# Patient Record
Sex: MALE | Race: BLACK OR AFRICAN AMERICAN | Employment: PART TIME | URBAN - METROPOLITAN AREA
[De-identification: names, ages, dates, MRNs, and addresses within clinical notes are randomized per-mention and may not be internally consistent; named-entity substitution may affect disease eponyms.]

---

## 2018-07-15 ENCOUNTER — HOSPITAL ENCOUNTER (EMERGENCY)
Age: 33
Discharge: HOME | End: 2018-07-15
Attending: EMERGENCY MEDICINE
Payer: SELF-PAY

## 2018-07-15 VITALS
DIASTOLIC BLOOD PRESSURE: 92 MMHG | SYSTOLIC BLOOD PRESSURE: 143 MMHG | OXYGEN SATURATION: 100 % | WEIGHT: 155 LBS | RESPIRATION RATE: 16 BRPM | HEIGHT: 74 IN | HEART RATE: 85 BPM | BODY MASS INDEX: 19.89 KG/M2 | TEMPERATURE: 98.4 F

## 2018-07-15 DIAGNOSIS — R21 RASH AND OTHER NONSPECIFIC SKIN ERUPTION: Primary | ICD-10-CM

## 2018-07-15 PROCEDURE — 99282 EMERGENCY DEPT VISIT SF MDM: CPT

## 2018-07-15 RX ORDER — CEPHALEXIN 500 MG/1
500 CAPSULE ORAL 4 TIMES DAILY
Qty: 28 CAP | Refills: 0 | Status: SHIPPED | OUTPATIENT
Start: 2018-07-15 | End: 2018-07-22

## 2018-07-15 RX ORDER — TRIAMCINOLONE ACETONIDE 1 MG/ML
LOTION TOPICAL 3 TIMES DAILY
Qty: 60 ML | Refills: 0 | Status: SHIPPED | OUTPATIENT
Start: 2018-07-15 | End: 2019-10-23

## 2018-07-15 RX ORDER — PREDNISONE 10 MG/1
TABLET ORAL
Qty: 21 TAB | Refills: 0 | Status: SHIPPED | OUTPATIENT
Start: 2018-07-15 | End: 2019-10-23

## 2018-07-15 NOTE — ED PROVIDER NOTES
EMERGENCY DEPARTMENT HISTORY AND PHYSICAL EXAM    11:52 AM      Date: 7/15/2018  Patient Name: Huong Guardado    History of Presenting Illness     Chief Complaint   Patient presents with    Rash     History Provided By: Patient    Chief Complaint: Rash  Duration: ~2 Weeks  Timing:  Worsening  Location: Bilateral forearms  Quality: Itchy and draining  Severity: 6 out of 10  Modifying Factors: No relief with Motrin or Calamine lotion  Associated Symptoms: denies any other associated signs or symptoms      Additional History (Context):Kai Lira is a 34 y.o. male with pertinent history of asthma who presents to the emergency department for evaluation of a worsening itchy and draining 6/10 rash on his bilateral forearms, onset ~2 weeks ago. Pt states that the rash first began as a heat rash and since then has been spreading to other parts of his body including his neck and legs. Pt reports hx and fhx of heat rashes but states that he has never had one to this extent. Pt states that he spent all of yesterday outside in the sun. Pt has tried Motrin and Calamine lotion with no relief. Endorses tobacco (1/4 ppd) and occasional EtOH and marijuana use. NKDA. Pt denies hx of eczema or psoriasis, contact with an individual with a similar rash, fevers, n/v/d/c, dysuria, hematuria, and frequency. Patient has no other complaints at this time. PCP:  Eri Mo MD    Past History     Past Medical History:  History reviewed. No pertinent past medical history. Past Surgical History:  History reviewed. No pertinent surgical history. Family History:  History reviewed. No pertinent family history.     Social History:  Social History   Substance Use Topics    Smoking status: Current Every Day Smoker     Packs/day: 0.25    Smokeless tobacco: Never Used    Alcohol use Yes      Comment: occ       Allergies:  No Known Allergies      Review of Systems     Review of Systems   Constitutional: Negative for chills and fever.   HENT: Negative for congestion, rhinorrhea and sore throat. Respiratory: Negative for cough and shortness of breath. Cardiovascular: Negative for chest pain. Gastrointestinal: Negative for abdominal pain, blood in stool, constipation, diarrhea, nausea and vomiting. Genitourinary: Negative for dysuria, frequency and hematuria. Musculoskeletal: Negative for back pain and myalgias. Skin: Positive for rash (bilateral forearms). Negative for wound. Neurological: Negative for dizziness and headaches. Physical Exam     Visit Vitals    BP (!) 143/92 (BP 1 Location: Right arm, BP Patient Position: At rest;Sitting)  Comment: denies Hx HTN    Pulse 85    Temp 98.4 °F (36.9 °C)    Resp 16    Ht 6' 2\" (1.88 m)    Wt 70.3 kg (155 lb)    SpO2 100%    BMI 19.9 kg/m2         Physical Exam   Constitutional: He is oriented to person, place, and time. He appears well-developed and well-nourished. No distress. HENT:   Head: Normocephalic and atraumatic. Eyes: Conjunctivae are normal.   Neck: Normal range of motion. Neck supple. Cardiovascular: Normal rate, regular rhythm and normal heart sounds. Pulmonary/Chest: Effort normal and breath sounds normal. No respiratory distress. He exhibits no tenderness. Musculoskeletal: Normal range of motion. He exhibits no edema or deformity. Neurological: He is alert and oriented to person, place, and time. Skin: Skin is warm and dry. Rash noted. He is not diaphoretic. Erythematous, scaling, plaques noted to flexural surfaces of BUE and bilateral neck. Some with serosanguinous weeping. Psychiatric: He has a normal mood and affect. Nursing note and vitals reviewed. Diagnostic Study Results     Labs -  No results found for this or any previous visit (from the past 12 hour(s)). Radiologic Studies -   No results found. Medical Decision Making   I am the first provider for this patient.     I reviewed the vital signs, available nursing notes, past medical history, past surgical history, family history and social history. Vital Signs-Reviewed the patient's vital signs. Pulse Oximetry Analysis -  100% on room air     Records Reviewed: Nursing Notes (Time of Review: 11:52 AM)    ED Course: Progress Notes, Reevaluation, and Consults:    Provider Notes (Medical Decision Making):   Differential Diagnosis:  Tinea, eczema, psoriasis, dry skin, scabies, allergic reaction, varicella, HSV, acne, bug bites, impetigo, folliculitis, pityriasis, poison ivy/oak/sumac, syphilis, lichen planus, erythema multiforme, Staphylococcal scalded skin syndrome, SJS/TEN, lupus, RMSF, erythema migrans, lichen planus, bullous pemphigoid, dermatitis herpetiformis, bacterial meningitis, chiggers    Plan:  Pt presents in NAD, mildly elevated BP with otherwise normal vitals. Exam c/w eczema with possible developing cellulitis. Will DC home with keflex, steroids, triamcinolone. At this time, patient is stable and appropriate for discharge home. Patient demonstrates understanding of current diagnoses and is in agreement with the treatment plan. They are advised that while the likelihood of serious underlying condition is low at this point given the evaluation performed today, we cannot fully rule it out. They are advised to immediately return with any new symptoms or worsening of current condition. All questions have been answered. Patient is given educational material regarding their diagnoses, including danger symptoms and when to return to the ED. Diagnosis     Clinical Impression:   1.  Rash and other nonspecific skin eruption      Disposition: DC home    Follow-up Information     Follow up With Details Comments Contact Antonio Gutierrez MD Call in 2 days For follow-up Gris Taylor [phone] [facility] EMERGENCY DEPT Go to As needed, If symptoms worsen [address]  [phone] Patient's Medications   Start Taking    CEPHALEXIN (KEFLEX) 500 MG CAPSULE    Take 1 Cap by mouth four (4) times daily for 7 days. PREDNISONE (DELTASONE) 10 MG TABLET    Take six pills on Day 1, five pills on Day 2, four pills on Day 3, three pills on Day 4, two pills on Day 5, and one pill on Day 6. TRIAMCINOLONE (KENALOG) 0.1 % LOTION    Apply  to affected area three (3) times daily. use thin layer   Continue Taking    No medications on file   These Medications have changed    No medications on file   Stop Taking    No medications on file     _______________________________     1 [facility] acting as a scribe for and in the presence of Lilo Mueller PA-C      July 15, 2018 at 12:03 PM       Provider Attestation:      I personally performed the services described in the documentation, reviewed the documentation, as recorded by the scribe in my presence, and it accurately and completely records my words and actions.  July 15, 2018 at 12:03 PM - Lilo Mueller PA-C

## 2019-10-23 ENCOUNTER — HOSPITAL ENCOUNTER (EMERGENCY)
Age: 34
Discharge: HOME | End: 2019-10-24
Attending: EMERGENCY MEDICINE | Admitting: EMERGENCY MEDICINE
Payer: SELF-PAY

## 2019-10-23 DIAGNOSIS — R06.2 WHEEZING: Primary | ICD-10-CM

## 2019-10-23 PROCEDURE — 99284 EMERGENCY DEPT VISIT MOD MDM: CPT

## 2019-10-24 VITALS
WEIGHT: 155 LBS | TEMPERATURE: 97.8 F | RESPIRATION RATE: 16 BRPM | SYSTOLIC BLOOD PRESSURE: 122 MMHG | BODY MASS INDEX: 19.89 KG/M2 | OXYGEN SATURATION: 97 % | DIASTOLIC BLOOD PRESSURE: 75 MMHG | HEART RATE: 80 BPM | HEIGHT: 74 IN

## 2019-10-24 RX ORDER — ALBUTEROL SULFATE 90 UG/1
2 AEROSOL, METERED RESPIRATORY (INHALATION)
Qty: 1 INHALER | Refills: 0 | Status: SHIPPED | OUTPATIENT
Start: 2019-10-24

## 2019-10-24 RX ORDER — ALBUTEROL SULFATE 90 UG/1
2 AEROSOL, METERED RESPIRATORY (INHALATION)
Status: DISCONTINUED | OUTPATIENT
Start: 2019-10-24 | End: 2019-10-24

## 2019-10-24 RX ORDER — ALBUTEROL SULFATE 0.83 MG/ML
2.5 SOLUTION RESPIRATORY (INHALATION)
Status: DISCONTINUED | OUTPATIENT
Start: 2019-10-24 | End: 2019-10-24

## 2019-10-24 NOTE — ED NOTES
I have reviewed discharge instructions with the patient. The patient verbalized understanding. Patient armband removed and shredded. Pt d/cd to home awake, alert and in NAD. All questions answered. 1 prescription given.

## 2019-10-24 NOTE — ED TRIAGE NOTES
Wheezing and SOB started this afternoon, no relief from albuterol inhaler. EMS administered Duoneb x1.

## 2019-10-24 NOTE — DISCHARGE INSTRUCTIONS
Patient Education        Wheezing or Bronchoconstriction: Care Instructions  Your Care Instructions  Wheezing is a whistling noise made during breathing. It occurs when the small airways, or bronchial tubes, that lead to your lungs swell or contract (spasm) and become narrow. This narrowing is called bronchoconstriction. When your airways constrict, it is hard for air to pass through and this makes it hard for you to breathe. Wheezing and bronchoconstriction can be caused by many problems, including:  · An infection such as the flu or a cold. · Allergies such as hay fever. · Diseases such as asthma or chronic obstructive pulmonary disease. · Smoking. Treatment for your wheezing depends on what is causing the problem. Your wheezing may get better without treatment. But you may need to pay attention to things that cause your wheezing and avoid them. Or you may need medicine to help treat the wheezing and to reduce the swelling or to relieve spasms in your lungs. Follow-up care is a key part of your treatment and safety. Be sure to make and go to all appointments, and call your doctor if you are having problems. It is also a good idea to know your test results and keep a list of the medicines you take. How can you care for yourself at home? · Take your medicine exactly as prescribed. Call your doctor if you think you are having a problem with your medicine. You will get more details on the specific medicine your doctor prescribes. · If your doctor prescribed antibiotics, take them as directed. Do not stop taking them just because you feel better. You need to take the full course of antibiotics. · Breathe moist air from a humidifier, hot shower, or sink filled with hot water. This may help ease your symptoms and make it easier for you to breathe. · If you have congestion in your nose and throat, drinking plenty of fluids, especially hot fluids, may help relieve your symptoms.  If you have kidney, heart, or liver disease and have to limit fluids, talk with your doctor before you increase the amount of fluids you drink. · If you have mucus in your airways, it may help to breathe deeply and cough. · Do not smoke or allow others to smoke around you. Smoking can make your wheezing worse. If you need help quitting, talk to your doctor about stop-smoking programs and medicines. These can increase your chances of quitting for good. · Avoid things that may cause your wheezing. These may include colds, smoke, air pollution, dust, pollen, pets, cockroaches, stress, and cold air. When should you call for help? Call 911 anytime you think you may need emergency care. For example, call if:    · You have severe trouble breathing.     · You passed out (lost consciousness).    Call your doctor now or seek immediate medical care if:    · You cough up yellow, dark brown, or bloody mucus (sputum).     · You have new or worse shortness of breath.     · Your wheezing is not getting better or it gets worse after you start taking your medicine.    Watch closely for changes in your health, and be sure to contact your doctor if:    · You do not get better as expected. Where can you learn more? Go to http://williams-fadi.info/. Enter 454 3023 in the search box to learn more about \"Wheezing or Bronchoconstriction: Care Instructions. \"  Current as of: June 9, 2019  Content Version: 12.2  © 4968-8087 [employer]. Care instructions adapted under license by [employer] (which disclaims liability or warranty for this information). If you have questions about a medical condition or this instruction, always ask your healthcare professional. [address] any warranty or liability for your use of this information.

## 2020-06-04 ENCOUNTER — HOSPITAL ENCOUNTER (EMERGENCY)
Age: 35
Discharge: HOME | End: 2020-06-04
Attending: EMERGENCY MEDICINE
Payer: SELF-PAY

## 2020-06-04 VITALS
SYSTOLIC BLOOD PRESSURE: 141 MMHG | TEMPERATURE: 98.9 F | HEART RATE: 76 BPM | HEIGHT: 74 IN | RESPIRATION RATE: 17 BRPM | BODY MASS INDEX: 20.53 KG/M2 | DIASTOLIC BLOOD PRESSURE: 88 MMHG | WEIGHT: 160 LBS | OXYGEN SATURATION: 98 %

## 2020-06-04 DIAGNOSIS — K08.89 PAIN, DENTAL: Primary | ICD-10-CM

## 2020-06-04 PROCEDURE — 74011250637 HC RX REV CODE- 250/637: Performed by: EMERGENCY MEDICINE

## 2020-06-04 PROCEDURE — 99283 EMERGENCY DEPT VISIT LOW MDM: CPT

## 2020-06-04 PROCEDURE — 74011000250 HC RX REV CODE- 250: Performed by: EMERGENCY MEDICINE

## 2020-06-04 PROCEDURE — 75810000283 HC INJECTION NERVE BLOCK

## 2020-06-04 RX ORDER — BUPIVACAINE HYDROCHLORIDE 5 MG/ML
5 INJECTION, SOLUTION EPIDURAL; INTRACAUDAL ONCE
Status: COMPLETED | OUTPATIENT
Start: 2020-06-04 | End: 2020-06-04

## 2020-06-04 RX ORDER — IBUPROFEN 600 MG/1
600 TABLET ORAL
Qty: 20 TAB | Refills: 0 | Status: SHIPPED | OUTPATIENT
Start: 2020-06-04

## 2020-06-04 RX ORDER — ACETAMINOPHEN 325 MG/1
650 TABLET ORAL
Qty: 20 TAB | Refills: 0 | Status: SHIPPED | OUTPATIENT
Start: 2020-06-04

## 2020-06-04 RX ORDER — PENICILLIN V POTASSIUM 250 MG/1
500 TABLET, FILM COATED ORAL
Status: COMPLETED | OUTPATIENT
Start: 2020-06-04 | End: 2020-06-04

## 2020-06-04 RX ORDER — PENICILLIN V POTASSIUM 500 MG/1
500 TABLET, FILM COATED ORAL 4 TIMES DAILY
Qty: 28 TAB | Refills: 0 | Status: SHIPPED | OUTPATIENT
Start: 2020-06-04 | End: 2020-06-11

## 2020-06-04 RX ADMIN — PENICILLIN V POTASIUM 500 MG: 250 TABLET ORAL at 07:44

## 2020-06-04 RX ADMIN — BUPIVACAINE HYDROCHLORIDE 25 MG: 5 INJECTION, SOLUTION EPIDURAL; INTRACAUDAL; PERINEURAL at 08:08

## 2020-06-04 NOTE — ED TRIAGE NOTES
Patient states broke a tooth off 8 months ago had non pain x 2 days severe pain - taken naproxen, motrin- Patient has dentist appointment Monday at 0800.  Patient states head throbbing

## 2020-06-04 NOTE — DISCHARGE INSTRUCTIONS
Patient Education        Head or Face Pain: Care Instructions  Your Care Instructions     Common causes of head or face pain are allergies, stress, and injuries. Other causes include tooth problems and sinus infections. Eating certain foods, such as chocolate or cheese, or drinking certain liquids, such as coffee or cola, can cause head pain for some people. If you have mild head pain, you may not need treatment. It is important to watch your symptoms and talk to your doctor if your pain continues or gets worse. Follow-up care is a key part of your treatment and safety. Be sure to make and go to all appointments, and call your doctor if you are having problems. It's also a good idea to know your test results and keep a list of the medicines you take. How can you care for yourself at home? · Take pain medicines exactly as directed. ? If the doctor gave you a prescription medicine for pain, take it as prescribed. ? If you are not taking a prescription pain medicine, ask your doctor if you can take an over-the-counter pain medicine. · Take it easy for the next few days or longer if you are not feeling well. · Use a warm, moist towel or heating pad set on low to relax tight muscles in your shoulder and neck. Have someone gently massage your neck and shoulders. · Put ice or a cold pack on the area for 10 to 20 minutes at a time. Put a thin cloth between the ice and your skin. When should you call for help? [id number] anytime you think you may need emergency care. For example, call if:  · You have twitching, jerking, or a seizure. · You passed out (lost consciousness). · You have symptoms of a stroke. These may include:  ? Sudden numbness, tingling, weakness, or loss of movement in your face, arm, or leg, especially on only one side of your body. ? Sudden vision changes. ? Sudden trouble speaking. ? Sudden confusion or trouble understanding simple statements. ? Sudden problems with walking or balance. ?  A sudden, severe headache that is different from past headaches. · You have jaw pain and pain in your chest, shoulder, neck, or arm. Call your doctor now or seek immediate medical care if:  · You have a fever with a stiff neck or a severe headache. · You have nausea and vomiting, or you cannot keep food or liquids down. Watch closely for changes in your health, and be sure to contact your doctor if:  · Your head or face pain does not get better as expected. Where can you learn more? Go to http://williams-fadi.info/  Enter P568 in the search box to learn more about \"Head or Face Pain: Care Instructions. \"  Current as of: June 26, 2019               Content Version: 12.5  © 8462-1628 [employer]. Care instructions adapted under license by [employer] (which disclaims liability or warranty for this information). If you have questions about a medical condition or this instruction, always ask your healthcare professional. [place] [address] any warranty or liability for your use of this information. Patient Education        Periodontal Conditions: Care Instructions  Your Care Instructions    Periodontal conditions affect the gums, bone, and tissue that surround and support the teeth. The most common problems are caused by plaque. Plaque is a thin film of bacteria that sticks to teeth above and below the gum line. It can build up and harden into tartar. The bacteria in plaque and tartar can cause gum disease. Gingivitis is a disease that affects the gums (gingiva). The gums are the soft tissue that surrounds the teeth. Gingivitis causes red, swollen, tender gums that bleed easily when brushed, persistent bad breath, and sensitive teeth. Because it is not painful, many people do not get treatment when they should. Gingivitis can be reversed with good dental care. Periodontitis is a more advanced disease that affects more than the gums.  The gums pull away from the teeth. This leaves deep pockets where bacteria can grow. The disease can damage the bones that support the teeth. The teeth may get loose and fall out. A periodontal condition should be treated as soon as it is found. Finding gum problems early, treating them right away, and having regular checkups bring the best results. You can treat mild periodontal conditions by brushing and flossing your teeth every day. Your dentist may prescribe a mouthwash to kill the bacteria that can damage teeth and gums. Your dentist may have you take antibiotics to treat infection from moderate periodontal disease. If your gums have pulled away from your teeth, you may need cleaning between the teeth and gums right down to the teeth roots. This is called root planing and scaling. If you have severe periodontal disease, you may need surgery to remove diseased gum tissue or repair bone damage. Follow-up care is a key part of your treatment and safety. Be sure to make and go to all appointments, and call your dentist if you are having problems. It's also a good idea to know your test results and keep a list of the medicines you take. How can you care for yourself at home? · If your dentist prescribed antibiotics, take them as directed. Do not stop taking them just because you feel better. You need to take the full course of antibiotics. · Brush your teeth twice a day, in the morning and at night. ? Use a toothbrush with soft, rounded-end bristles and a head that is small enough to reach all parts of your teeth and mouth. Replace your toothbrush every 3 to 4 months. ? Use a fluoride toothpaste. ? Place the brush at a 45-degree angle where the teeth meet the gums. Press firmly, and gently rock the brush back and forth using small circular movements. ? Brush chewing surfaces vigorously with short back-and-forth strokes. ? Brush your tongue from back to front. · Floss at least once a day.  Choose the type and flavor that you like best.  · Have your teeth cleaned by a professional at least twice a year. · Ask your dentist about using an antibacterial mouthwash to help reduce bacteria. · Rinse your mouth with water or chew sugar-free gum after meals if you can't brush your teeth. · Do not smoke or use smokeless tobacco. Tobacco use can cause periodontal disease. When should you call for help? Call your dentist now or seek immediate medical care if:  · You have symptoms of infection, such as:  ? Increased pain, swelling, warmth, or redness. ? Red streaks leading from the area. ? Pus draining from the area. ? A fever. Watch closely for changes in your health, and be sure to contact your dentist if:  · You have new or worse tooth pain. · You do not get better as expected. Where can you learn more? Go to http://williams-fadi.info/  Enter H913 in the search box to learn more about \"Periodontal Conditions: Care Instructions. \"  Current as of: March 25, 2020               Content Version: 12.5  © 2006-2020 [employer]. Care instructions adapted under license by [employer] (which disclaims liability or warranty for this information). If you have questions about a medical condition or this instruction, always ask your healthcare professional. [address] any warranty or liability for your use of this information.

## 2020-06-04 NOTE — ED PROVIDER NOTES
Date: 6/4/2020  Patient Name: Charis Drew    History of Presenting Illness     Chief Complaint   Patient presents with    Dental Pain       History Provided By: Patient    HPI/Chief Complaint: (Context):who presents with dental pain, greater than a week  Left lower jaw  Constant symptoms throbbing symptoms tried some oral medication and also topical medication not working has an appointment in 3 days  History of trauma months ago. Progressively worse in the last few days  No fever no cough no congestion no trismus no swelling no trouble swallowing no chest pain or shortness of breath no abdominal pain no cough congestion no fever  Patient is otherwise in no distress no complaints in the emergency department there is pain radiating to the left jaw and face as well as patient moves and shoes on that side. Sharp pain  Moderate to severe pain    ---  Blood pressure patient's triage note is reviewed  TITUS level 4  Dental pain  Blood pressure is 144/1 2  Rest of vitals are stable in the emergency department  Patient broken tooth pain increased  Pain is 9 out of 10  Allergies are none  Home medication include albuterol  Medical history is asthma  Social is a smoker, alcohol, marijuana use  Patient's chart review shows patient has prior visit for wheezing and rash. PCP: None    Current Outpatient Medications   Medication Sig Dispense Refill    benzocaine (ORAJEL) 10 % mucosal gel 1 Applicator by Mouth/Throat route three (3) times daily for 3 days. 9 g 0    ibuprofen (MOTRIN) 600 mg tablet Take 1 Tab by mouth every six (6) hours as needed for Pain. 20 Tab 0    acetaminophen (TYLENOL) 325 mg tablet Take 2 Tabs by mouth every four (4) hours as needed for Pain. 20 Tab 0    penicillin v potassium (VEETID) 500 mg tablet Take 1 Tab by mouth four (4) times daily for 7 days.  28 Tab 0    albuterol (PROVENTIL HFA, VENTOLIN HFA, PROAIR HFA) 90 mcg/actuation inhaler Take 2 Puffs by inhalation every four (4) hours as needed for Wheezing. 1 Inhaler 0       Past History     Past Medical History:  Past Medical History:   Diagnosis Date    Asthma        Past Surgical History:  No past surgical history on file. Family History:  No family history on file. Social History:  Social History     Tobacco Use    Smoking status: Current Every Day Smoker     Packs/day: 0.10    Smokeless tobacco: Never Used   Substance Use Topics    Alcohol use: Yes     Comment: socially    Drug use: Not Currently     Types: Marijuana       Allergies:  No Known Allergies      Review of Systems   Review of Systems   Constitutional: Negative for activity change, fatigue and fever. HENT: Positive for dental problem. Negative for congestion and rhinorrhea. Eyes: Negative for visual disturbance. Respiratory: Negative for shortness of breath. Cardiovascular: Negative for chest pain and palpitations. Gastrointestinal: Negative for abdominal pain, diarrhea, nausea and vomiting. Genitourinary: Negative for dysuria and hematuria. Musculoskeletal: Negative for back pain. Skin: Negative for rash. Neurological: Negative for dizziness, weakness and light-headedness. Psychiatric/Behavioral: Negative for agitation. All other systems reviewed and are negative. Physical Exam     Physical Exam  Constitutional:       Appearance: He is well-developed. HENT:      Head: Normocephalic and atraumatic. Comments: Left lower jaw, tenderness in the first molar  No swelling no erythema no warmth to the soft tissue no abscess no fluctuance or any discharge. Full range of motion mouth  No complaints otherwise  No trismus no cervical lymphadenopathy    Eyes:      Conjunctiva/sclera: Conjunctivae normal.      Pupils: Pupils are equal, round, and reactive to light. Neck:      Musculoskeletal: Normal range of motion and neck supple. Cardiovascular:      Rate and Rhythm: Normal rate and regular rhythm.    Pulmonary:      Effort: Pulmonary effort is normal.      Breath sounds: Normal breath sounds. Abdominal:      General: Bowel sounds are normal.      Palpations: Abdomen is soft. Musculoskeletal: Normal range of motion. Lymphadenopathy:      Cervical: No cervical adenopathy. Skin:     General: Skin is warm. Neurological:      Mental Status: He is alert. Medical Decision Making   I am the first provider for this patient. I reviewed the vital signs, available nursing notes, past medical history, past surgical history, family history and social history. Provider Notes (Medical Decision Making): Patient with dental pain  No sign of abscess or cellulitis  Acute antibiotics follow-up recommended  We discussed pain management of added all the medications taking him in the emergency department I will also give him Marcaine injection as patient is requesting that as well for acute pain  Agrees with all information agrees with the plan agrees to follow-up      Vital Signs-Reviewed the patient's vital signs. Pulse Oximetry Analysis -99%, room air, normal    Vitals:    06/04/20 0651 06/04/20 0735   BP: (!) 144/102 129/85   Pulse: 76    Resp: 17    Temp: 98.9 °F (37.2 °C)    SpO2: 99% 98%   Weight: 72.6 kg (160 lb)    Height: 6' 2\" (1.88 m)        Records Reviewed: Nursing Notes    ED Course:     Nerve Block  Date/Time: 6/4/2020 8:13 AM  Performed by: Dilan Gan MD  Authorized by: Dilan Gan MD     Consent:     Consent obtained:  Verbal    Consent given by:  Patient    Risks discussed:   Allergic reaction, bleeding, infection, intravenous injection, pain, nerve damage, swelling and unsuccessful block    Alternatives discussed:  Referral, alternative treatment, delayed treatment and no treatment  Indications:     Indications:  Pain relief  Location:     Nerve block body site: alveolar nerve - left oral.    Laterality:  Left  Skin anesthesia (see MAR for exact dosages):     Skin anesthesia method:  None  Procedure details (see MAR for exact dosages): Block needle gauge:  27 G    Anesthetic injected:  Bupivacaine 0.5% w/o epi    Steroid injected:  None    Additive injected:  None    Injection procedure:  Anatomic landmarks identified    Paresthesia:  None  Post-procedure details:     Dressing:  None    Outcome:  Anesthesia achieved    Patient tolerance of procedure: Tolerated well, no immediate complications              Diagnostic Study Results     Orders Placed This Encounter    NERVE BLOCK (ASAP ONLY)     This order was created via procedure documentation     Standing Status:   Standing     Number of Occurrences:   1    bupivacaine (PF) (MARCAINE) 0.5 % (5 mg/mL) injection 25 mg    penicillin v potassium (VEETID) tablet 500 mg     Order Specific Question:   Antibiotic Indications     Answer:   Skin and Soft Tissue Infection    benzocaine (ORAJEL) 10 % mucosal gel     Si Applicator by Mouth/Throat route three (3) times daily for 3 days. Dispense:  9 g     Refill:  0    ibuprofen (MOTRIN) 600 mg tablet     Sig: Take 1 Tab by mouth every six (6) hours as needed for Pain. Dispense:  20 Tab     Refill:  0    acetaminophen (TYLENOL) 325 mg tablet     Sig: Take 2 Tabs by mouth every four (4) hours as needed for Pain. Dispense:  20 Tab     Refill:  0    penicillin v potassium (VEETID) 500 mg tablet     Sig: Take 1 Tab by mouth four (4) times daily for 7 days. Dispense:  28 Tab     Refill:  0       Labs -   No results found for this or any previous visit (from the past 12 hour(s)). Radiologic Studies -   No orders to display     CT Results  (Last 48 hours)    None        CXR Results  (Last 48 hours)    None              Discharge     Clinical Impression:   1.  Pain, dental        Disposition:  Home    It should be noted that I will be the provider of record for this patient  Lisa Pretty MD      Follow-up Information     Follow up With Specialties Details Why [facility] EMERGENCY DEPT Emergency Medicine  If symptoms worsen [address] [place] [address]    [place]  Call in 1 day Follow Up From Emergency Department Brian Velasquez Emmett Acosta Erik          Current Discharge Medication List      START taking these medications    Details   benzocaine (ORAJEL) 10 % mucosal gel 1 Applicator by Mouth/Throat route three (3) times daily for 3 days. Qty: 9 g, Refills: 0      ibuprofen (MOTRIN) 600 mg tablet Take 1 Tab by mouth every six (6) hours as needed for Pain. Qty: 20 Tab, Refills: 0      acetaminophen (TYLENOL) 325 mg tablet Take 2 Tabs by mouth every four (4) hours as needed for Pain. Qty: 20 Tab, Refills: 0      penicillin v potassium (VEETID) 500 mg tablet Take 1 Tab by mouth four (4) times daily for 7 days.   Qty: 28 Tab, Refills: 0

## 2020-06-20 ENCOUNTER — HOSPITAL ENCOUNTER (EMERGENCY)
Age: 35
Discharge: HOME | End: 2020-06-20
Attending: EMERGENCY MEDICINE
Payer: SELF-PAY

## 2020-06-20 ENCOUNTER — APPOINTMENT (OUTPATIENT)
Dept: CT IMAGING | Age: 35
End: 2020-06-20
Attending: PHYSICIAN ASSISTANT
Payer: SELF-PAY

## 2020-06-20 VITALS
OXYGEN SATURATION: 100 % | BODY MASS INDEX: 20.53 KG/M2 | RESPIRATION RATE: 16 BRPM | DIASTOLIC BLOOD PRESSURE: 78 MMHG | HEIGHT: 74 IN | TEMPERATURE: 98 F | WEIGHT: 160 LBS | SYSTOLIC BLOOD PRESSURE: 132 MMHG | HEART RATE: 69 BPM

## 2020-06-20 DIAGNOSIS — L30.9 ECZEMA, UNSPECIFIED TYPE: Primary | ICD-10-CM

## 2020-06-20 DIAGNOSIS — D72.829 LEUKOCYTOSIS, UNSPECIFIED TYPE: ICD-10-CM

## 2020-06-20 DIAGNOSIS — L08.9 SKIN INFECTION: ICD-10-CM

## 2020-06-20 DIAGNOSIS — R10.13 ABDOMINAL PAIN, EPIGASTRIC: ICD-10-CM

## 2020-06-20 DIAGNOSIS — R11.0 NAUSEA WITHOUT VOMITING: ICD-10-CM

## 2020-06-20 LAB
ALBUMIN SERPL-MCNC: 4 G/DL (ref 3.4–5)
ALBUMIN/GLOB SERPL: 1.1 {RATIO} (ref 0.8–1.7)
ALP SERPL-CCNC: 85 U/L (ref 45–117)
ALT SERPL-CCNC: 18 U/L (ref 16–61)
ANION GAP SERPL CALC-SCNC: 9 MMOL/L (ref 3–18)
AST SERPL-CCNC: 20 U/L (ref 10–38)
ATRIAL RATE: 62 BPM
BASOPHILS # BLD: 0 K/UL (ref 0–0.1)
BASOPHILS NFR BLD: 0 % (ref 0–2)
BILIRUB SERPL-MCNC: 0.8 MG/DL (ref 0.2–1)
BUN SERPL-MCNC: 11 MG/DL (ref 7–18)
BUN/CREAT SERPL: 10 (ref 12–20)
CALCIUM SERPL-MCNC: 8.9 MG/DL (ref 8.5–10.1)
CALCULATED P AXIS, ECG09: 47 DEGREES
CALCULATED R AXIS, ECG10: 64 DEGREES
CALCULATED T AXIS, ECG11: 59 DEGREES
CHLORIDE SERPL-SCNC: 103 MMOL/L (ref 100–111)
CK MB CFR SERPL CALC: NORMAL % (ref 0–4)
CK MB SERPL-MCNC: <1 NG/ML (ref 5–25)
CK SERPL-CCNC: 95 U/L (ref 39–308)
CO2 SERPL-SCNC: 25 MMOL/L (ref 21–32)
CREAT SERPL-MCNC: 1.06 MG/DL (ref 0.6–1.3)
DIAGNOSIS, 93000: NORMAL
DIFFERENTIAL METHOD BLD: ABNORMAL
EOSINOPHIL # BLD: 0.2 K/UL (ref 0–0.4)
EOSINOPHIL NFR BLD: 1 % (ref 0–5)
ERYTHROCYTE [DISTWIDTH] IN BLOOD BY AUTOMATED COUNT: 14.4 % (ref 11.6–14.5)
GLOBULIN SER CALC-MCNC: 3.8 G/DL (ref 2–4)
GLUCOSE SERPL-MCNC: 155 MG/DL (ref 74–99)
HCT VFR BLD AUTO: 46.7 % (ref 36–48)
HGB BLD-MCNC: 15.8 G/DL (ref 13–16)
LIPASE SERPL-CCNC: 36 U/L (ref 73–393)
LYMPHOCYTES # BLD: 1.1 K/UL (ref 0.9–3.6)
LYMPHOCYTES NFR BLD: 6 % (ref 21–52)
MCH RBC QN AUTO: 29.8 PG (ref 24–34)
MCHC RBC AUTO-ENTMCNC: 33.8 G/DL (ref 31–37)
MCV RBC AUTO: 87.9 FL (ref 74–97)
MONOCYTES # BLD: 1.7 K/UL (ref 0.05–1.2)
MONOCYTES NFR BLD: 9 % (ref 3–10)
NEUTS SEG # BLD: 15.2 K/UL (ref 1.8–8)
NEUTS SEG NFR BLD: 84 % (ref 40–73)
P-R INTERVAL, ECG05: 168 MS
PLATELET # BLD AUTO: 385 K/UL (ref 135–420)
PMV BLD AUTO: 9.4 FL (ref 9.2–11.8)
POTASSIUM SERPL-SCNC: 3.7 MMOL/L (ref 3.5–5.5)
PROT SERPL-MCNC: 7.8 G/DL (ref 6.4–8.2)
Q-T INTERVAL, ECG07: 418 MS
QRS DURATION, ECG06: 84 MS
QTC CALCULATION (BEZET), ECG08: 424 MS
RBC # BLD AUTO: 5.31 M/UL (ref 4.7–5.5)
SODIUM SERPL-SCNC: 137 MMOL/L (ref 136–145)
TROPONIN I SERPL-MCNC: <0.02 NG/ML (ref 0–0.04)
VENTRICULAR RATE, ECG03: 62 BPM
WBC # BLD AUTO: 18.3 K/UL (ref 4.6–13.2)

## 2020-06-20 PROCEDURE — 74011250637 HC RX REV CODE- 250/637: Performed by: PHYSICIAN ASSISTANT

## 2020-06-20 PROCEDURE — 82550 ASSAY OF CK (CPK): CPT

## 2020-06-20 PROCEDURE — 80053 COMPREHEN METABOLIC PANEL: CPT

## 2020-06-20 PROCEDURE — 83690 ASSAY OF LIPASE: CPT

## 2020-06-20 PROCEDURE — 99284 EMERGENCY DEPT VISIT MOD MDM: CPT

## 2020-06-20 PROCEDURE — 85025 COMPLETE CBC W/AUTO DIFF WBC: CPT

## 2020-06-20 PROCEDURE — 93005 ELECTROCARDIOGRAM TRACING: CPT

## 2020-06-20 PROCEDURE — 74011636637 HC RX REV CODE- 636/637: Performed by: PHYSICIAN ASSISTANT

## 2020-06-20 RX ORDER — METHYLPREDNISOLONE 4 MG/1
TABLET ORAL
Qty: 1 DOSE PACK | Refills: 0 | Status: SHIPPED | OUTPATIENT
Start: 2020-06-20

## 2020-06-20 RX ORDER — ONDANSETRON 4 MG/1
TABLET, ORALLY DISINTEGRATING ORAL
Qty: 10 TAB | Refills: 0 | Status: SHIPPED | OUTPATIENT
Start: 2020-06-20

## 2020-06-20 RX ORDER — ACETAMINOPHEN 325 MG/1
650 TABLET ORAL
Status: COMPLETED | OUTPATIENT
Start: 2020-06-20 | End: 2020-06-20

## 2020-06-20 RX ORDER — ONDANSETRON 4 MG/1
4 TABLET, ORALLY DISINTEGRATING ORAL ONCE
Status: COMPLETED | OUTPATIENT
Start: 2020-06-20 | End: 2020-06-20

## 2020-06-20 RX ORDER — DOXYCYCLINE 100 MG/1
100 CAPSULE ORAL 2 TIMES DAILY
Qty: 14 CAP | Refills: 0 | Status: SHIPPED | OUTPATIENT
Start: 2020-06-20 | End: 2020-06-27

## 2020-06-20 RX ORDER — DOXYCYCLINE 100 MG/1
100 CAPSULE ORAL
Status: COMPLETED | OUTPATIENT
Start: 2020-06-20 | End: 2020-06-20

## 2020-06-20 RX ORDER — PREDNISONE 20 MG/1
60 TABLET ORAL
Status: COMPLETED | OUTPATIENT
Start: 2020-06-20 | End: 2020-06-20

## 2020-06-20 RX ORDER — TRIAMCINOLONE ACETONIDE 1 MG/G
CREAM TOPICAL 2 TIMES DAILY
Qty: 80 G | Refills: 0 | Status: SHIPPED | OUTPATIENT
Start: 2020-06-20

## 2020-06-20 RX ADMIN — ACETAMINOPHEN 650 MG: 325 TABLET, FILM COATED ORAL at 15:35

## 2020-06-20 RX ADMIN — DOXYCYCLINE 100 MG: 100 CAPSULE ORAL at 15:35

## 2020-06-20 RX ADMIN — PREDNISONE 60 MG: 20 TABLET ORAL at 15:35

## 2020-06-20 RX ADMIN — ONDANSETRON 4 MG: 4 TABLET, ORALLY DISINTEGRATING ORAL at 15:35

## 2020-06-20 NOTE — ED NOTES
I have reviewed discharge instruction and prescriptions with patient. Patient verbalized understanding and has no further questions at this time. Education taught and patient verbalized understanding of education. Teach back method used. Patients pain 9/10 at time of discharge. Belongings given to patient. Patient discharged ambulatory to home.

## 2020-06-20 NOTE — ED TRIAGE NOTES
Patient co chest pain and dizziness x 1 day. Also, co right arm pain and rash. States his rash was treated with antibiotic 6 months ago and cleared up but has returned.

## 2020-06-20 NOTE — ED PROVIDER NOTES
HPI     Nithya Shelley is a 37 y.o. male dilatory to ED complaining of eczema flare that now progressed to the point that skin of right forearm \"is breaking\" and oozing, notes that he was treated with antibiotic for similar flareup. Denies joint pain. Denies fever, nausea, vomiting. Also reports mild epigastric pain x1 day, denies dizziness to provider but reported dizziness to triage nurse. Denies cough. Denies heartburn. He has history of asthma but denies asthma flare. He has no cardiac history. Past Medical History:   Diagnosis Date    Asthma        History reviewed. No pertinent surgical history. History reviewed. No pertinent family history.     Social History     Socioeconomic History    Marital status: SINGLE     Spouse name: Not on file    Number of children: Not on file    Years of education: Not on file    Highest education level: Not on file   Occupational History    Not on file   Social Needs    Financial resource strain: Not on file    Food insecurity     Worry: Not on file     Inability: Not on file    Transportation needs     Medical: Not on file     Non-medical: Not on file   Tobacco Use    Smoking status: Current Every Day Smoker     Packs/day: 0.10    Smokeless tobacco: Never Used   Substance and Sexual Activity    Alcohol use: Yes     Comment: socially    Drug use: Not Currently     Types: Marijuana    Sexual activity: Not on file   Lifestyle    Physical activity     Days per week: Not on file     Minutes per session: Not on file    Stress: Not on file   Relationships    Social connections     Talks on phone: Not on file     Gets together: Not on file     Attends Gnosticist service: Not on file     Active member of club or organization: Not on file     Attends meetings of clubs or organizations: Not on file     Relationship status: Not on file    Intimate partner violence     Fear of current or ex partner: Not on file     Emotionally abused: Not on file Physically abused: Not on file     Forced sexual activity: Not on file   Other Topics Concern    Not on file   Social History Narrative    Not on file         ALLERGIES: Patient has no known allergies. Review of Systems   Constitutional: Negative. Negative for activity change, appetite change, chills and fever. HENT: Negative. Denies cold-like symptoms   Eyes: Negative for visual disturbance. Respiratory: Negative for cough, chest tightness and shortness of breath. Cardiovascular: Negative for chest pain, palpitations and leg swelling. Gastrointestinal: Positive for abdominal pain (Mild epigastric pain). Negative for constipation, diarrhea, nausea and vomiting. Genitourinary: Negative. Negative for difficulty urinating. Musculoskeletal: Negative. Negative for neck pain and neck stiffness. Skin: Positive for rash (Eczema to arms). Negative for color change and pallor. Neurological: Negative for dizziness, syncope, weakness and headaches. Hematological: Negative for adenopathy. Does not bruise/bleed easily. Psychiatric/Behavioral: Negative for agitation. The patient is not nervous/anxious. All other systems reviewed and are negative. Vitals:    06/20/20 1225 06/20/20 1228 06/20/20 1543   BP:  143/72 132/78   Pulse: 69  69   Resp: 18  16   Temp: 98.4 °F (36.9 °C)  98 °F (36.7 °C)   SpO2: 100%  100%   Weight: 72.6 kg (160 lb)     Height: 6' 2\" (1.88 m)              Physical Exam  Vitals signs and nursing note reviewed. Constitutional:       General: He is not in acute distress. Appearance: He is well-developed. He is not toxic-appearing. HENT:      Head: Normocephalic and atraumatic. Nose: Nose normal.      Mouth/Throat:      Mouth: Mucous membranes are moist.      Pharynx: Oropharynx is clear. Eyes:      General: No scleral icterus. Conjunctiva/sclera: Conjunctivae normal.      Pupils: Pupils are equal, round, and reactive to light.    Neck: Musculoskeletal: Normal range of motion and neck supple. Cardiovascular:      Rate and Rhythm: Normal rate and regular rhythm. Pulses: Normal pulses. Heart sounds: Normal heart sounds. Pulmonary:      Effort: Pulmonary effort is normal. No respiratory distress. Breath sounds: Normal breath sounds. Abdominal:      General: Bowel sounds are normal. There is no distension. Palpations: Abdomen is soft. There is no mass. Tenderness: There is no abdominal tenderness. There is no right CVA tenderness, left CVA tenderness, guarding or rebound. Hernia: No hernia is present. Musculoskeletal: Normal range of motion. Right lower leg: He exhibits no tenderness. No edema. Left lower leg: He exhibits no tenderness. No edema. Lymphadenopathy:      Cervical: No cervical adenopathy. Skin:     General: Skin is warm and dry. Findings: Rash (Extensive dry scaly rash with papular elements located over upper extremities, right forearm with diffuse swelling, skin fissures with crust deposits, joints are not affected) present. Neurological:      Mental Status: He is alert and oriented to person, place, and time. Deep Tendon Reflexes: Reflexes are normal and symmetric. MDM  Number of Diagnoses or Management Options  Abdominal pain, epigastric:   Eczema, unspecified type:   Leukocytosis, unspecified type:   Nausea without vomiting:   Skin infection:   Diagnosis management comments: Well-appearing patient presents with unrelated complaints:  1) has eczema and right arm with sign of secondary infection, will cover with doxycycline, will treat eczema with triamcinolone cream and Medrol pack. Patient will follow-up closely with his PCP. Patient will return to ER for any worsening. 2) patient reports epigastric pain and nausea, feels significantly better after Zofran. Patient denies vomiting, he has benign abdomen without peritoneal signs.   WBCs are 18.3, this elevation may be related to cellulitis developing in the right forearm want to intra-abdominal process. CT was ordered but declined. Patient is advised to return to ER for any abdominal pain or any worsening. He is discharged in stable condition. Discussed results, care in ED and further care, f/u and s/s warranting return to ED. Pt and family present understood and agreed to plan. Amount and/or Complexity of Data Reviewed  Clinical lab tests: ordered and reviewed (WBC 18.3)  Tests in the radiology section of CPT®: ordered (Declined)    Risk of Complications, Morbidity, and/or Mortality  Presenting problems: moderate  Diagnostic procedures: moderate  Management options: moderate    Patient Progress  Patient progress: other (comment) (Abdominal pain resolved)         Procedures      Medications ordered:   Medications   ondansetron (ZOFRAN ODT) tablet 4 mg (4 mg Oral Given 6/20/20 1535)   acetaminophen (TYLENOL) tablet 650 mg (650 mg Oral Given 6/20/20 1535)   doxycycline (MONODOX) capsule 100 mg (100 mg Oral Given 6/20/20 1535)   predniSONE (DELTASONE) tablet 60 mg (60 mg Oral Given 6/20/20 1535)        Lab findings:   Labs Reviewed   CBC WITH AUTOMATED DIFF - Abnormal; Notable for the following components:       Result Value    WBC 18.3 (*)     NEUTROPHILS 84 (*)     LYMPHOCYTES 6 (*)     ABS. NEUTROPHILS 15.2 (*)     ABS.  MONOCYTES 1.7 (*)     All other components within normal limits   METABOLIC PANEL, COMPREHENSIVE - Abnormal; Notable for the following components:    Glucose 155 (*)     BUN/Creatinine ratio 10 (*)     All other components within normal limits   LIPASE - Abnormal; Notable for the following components:    Lipase 36 (*)     All other components within normal limits   CARDIAC PANEL,(CK, CKMB & TROPONIN)        EKG interpretation:   EKG Results     Procedure 720 Value Units Date/Time    EKG, 12 LEAD, INITIAL [[id number]] Collected:  06/20/20 1221    Order Status:  Completed Updated:  06/20/20 1612 Ventricular Rate 62 BPM      Atrial Rate 62 BPM      P-R Interval 168 ms      QRS Duration 84 ms      Q-T Interval 418 ms      QTC Calculation (Bezet) 424 ms      Calculated P Axis 47 degrees      Calculated R Axis 64 degrees      Calculated T Axis 59 degrees      Diagnosis --     Normal sinus rhythm  Nonspecific T wave abnormality  Abnormal ECG  When compared with ECG of 24-MAR-2013 09:23,  Nonspecific T wave abnormality, worse in Anterolateral leads  Confirmed by Janet Dobbs MD, Graciela Nieves ([id number]) on 6/20/2020 4:12:20 PM             X-Ray, CT or other radiology findings or impressions:   No orders to display        Progress notes, Consult notes or additional Procedure notes:      PROGRESS NOTES:  2:06 PM   Patient is awaiting labwork, he is stable, staying with his uncle in the waiting room,   He is also awaiting medications that are ordered. will continue to monitor. GHASSAN Ortiz     PROGRESS NOTES:  3:12 PM   WBC 18.3, labs are reassuring otherwise, will obtain abd CT. GHASSAN Luther     Consult:    3:13 PM   Discussed care with Oscar Sampson ED attending. Standard discussion; including history of patients chief complaint, available diagnostic results, and treatment course. PLAN: agrees with the plan to CT abd/pelv. Yesenia Cervantes PA-C     PROGRESS NOTES:  3:23 PM   Pt now states that his abd pain is fully resolved and declines CT. he is aware that his WBC of elevated which may indicate infection or inflammation. Again, he insists that his abdominal pain is fully resolved. WBC elevation may be attributed to skin infection that will be treated with doxycycline, first dose given here. He will be discharged with strict precautions and will return to ER for worsening abdominal pain immediately. At this time, he is happy with the plan to be treated for eczema and secondary skin infection. Discussed results, care in ED and further care, f/u and s/s warranting return to ED.  Pt and understood and agreed to plan. Diagnosis:   1. Eczema, unspecified type    2. Skin infection    3. Abdominal pain, epigastric    4. Leukocytosis, unspecified type    5. Nausea without vomiting          Disposition: home    DISCHARGE INSTRUCTIONS:  If your are prescribed Doxycycline, avoid sun exposure and use sunscreen, wear long sleeve shirts and pants. Doxycycline use puts you at risk for sunburn. Apply  Triamcinolone cream to affected area two (2) times a day as needed. Finish oral steroids (like Medrol, Prednisone, Dexamethasone, Sterapred, Prednisolone, Orapred, etc) as directed. Note that steroids may cause temporary increase of blood glucose levels, if you are diabetic, monitor your glucose levels closely. Stop steroids and consult your primary care provider or endocrinologist if your levels increase significantly. Maintain liquid diet today and proceed to BRAT (bananas, rice, apple sauce and toast) as tolerated. Resume regular diet gradually. Take Zofran or Phenergan for nausea as needed as directed. Return to emergency room within 8 to 24 hours for recheck of abdominal pain. Acute or surgical abdomen cannot be ruled out even with reassuring presentation. Failure to follow-up may result in worsening of intra-abdominal process and delay of care that may lead to complications and even death. Follow-up Information     Follow up With Specialties Details Why [facility] EMERGENCY DEPT Emergency Medicine In 2 days for recheck of current symptoms without fail. Return sooner if not better of if any worse Yuan E Alfred Young  [phone]          Discharge Medication List as of 6/20/2020  3:33 PM      START taking these medications    Details   methylPREDNISolone (Medrol, Jacobo,) 4 mg tablet Follow package instructions. , Print, Disp-1 Dose Pack, R-0      doxycycline (MONODOX) 100 mg capsule Take 1 Cap by mouth two (2) times a day for 7 days. , Print, Disp-14 Cap, R-0 ondansetron (Zofran ODT) 4 mg disintegrating tablet Take 1-2 tablets every 6-8 hours as needed for nausea and vomiting., Print, Disp-10 Tab, R-0      triamcinolone acetonide (KENALOG) 0.1 % topical cream Apply  to affected area two (2) times a day. use thin layer, Print, Disp-80 g, R-0         CONTINUE these medications which have NOT CHANGED    Details   ibuprofen (MOTRIN) 600 mg tablet Take 1 Tab by mouth every six (6) hours as needed for Pain., Print, Disp-20 Tab, R-0      acetaminophen (TYLENOL) 325 mg tablet Take 2 Tabs by mouth every four (4) hours as needed for Pain., Print, Disp-20 Tab, R-0      albuterol (PROVENTIL HFA, VENTOLIN HFA, PROAIR HFA) 90 mcg/actuation inhaler Take 2 Puffs by inhalation every four (4) hours as needed for Wheezing., Print, Disp-1 Inhaler, R-0                Dictation disclaimer:  Please note that this dictation was completed with 9+, the [employer] voice recognition software. Quite often unanticipated grammatical, syntax, homophones, and other interpretive errors are inadvertently transcribed by the computer software. Please disregard these errors. Please excuse any errors that have escaped final proofreading.

## 2020-06-20 NOTE — DISCHARGE INSTRUCTIONS
DISCHARGE INSTRUCTIONS:  If your are prescribed Doxycycline, avoid sun exposure and use sunscreen, wear long sleeve shirts and pants. Doxycycline use puts you at risk for sunburn. Apply  Triamcinolone cream to affected area two (2) times a day as needed. Finish oral steroids (like Medrol, Prednisone, Dexamethasone, Sterapred, Prednisolone, Orapred, etc) as directed. Note that steroids may cause temporary increase of blood glucose levels, if you are diabetic, monitor your glucose levels closely. Stop steroids and consult your primary care provider or endocrinologist if your levels increase significantly. Maintain liquid diet today and proceed to BRAT (bananas, rice, apple sauce and toast) as tolerated. Resume regular diet gradually. Take Zofran or Phenergan for nausea as needed as directed. Return to emergency room within 8 to 24 hours for recheck of abdominal pain. Acute or surgical abdomen cannot be ruled out even with reassuring presentation. Failure to follow-up may result in worsening of intra-abdominal process and delay of care that may lead to complications and even death.